# Patient Record
(demographics unavailable — no encounter records)

---

## 2025-07-16 NOTE — HISTORY OF PRESENT ILLNESS
[FreeTextEntry1] :   Switching gastros, last GI called her drunk Adela Echeverria.  Recently diagnosed with HP, did antibiotic round, wants to get tested again. Has been 2 mos since completing therapy. Not on any acid blocking medications at this time.  She went to see Dr. Echeverria because of weird sharp pains in pelvic area. She saw gyn a few times and gyn can't see anything that might be causing it.  For 1.5 yrs - 2 yrs she has been having random sharp pains, lightning crotch that happens.  When her gyn couldn't figure it out, had her see a GI and did a clean out and seemed to help for possible constipation.  However it is coming back, came back strong when on antibiotics.  She was going to order pelvic MRI to see if any other issues.   Has never had a colonoscopy.   Goes every 2 days for BM. Hard stools.  In terms of a bowel regimen, does not do anything.  Tried fiber that you put in water.  Good about drinking water. Drinks 4-5 glasses of water a day.   Pain is generally on L side of body, sharp stab of pain. It does not last long but it can be disruptive.  Since the antibiotics started, was happening several times a day 10x +. Now happens once every 4-5 days.  No better or worse with BM or eating. seems very random. Not associated with movement. Pain is inside, lightning crotch when pregnant.   Has not noticed a link between anxiety and pain  PMH:  2 uteruses, 2 cervixes  anxiety  PSH:   jacquie put down leg   FH:  grandparents both had colon cancer on dad's side   SH:  smokes cigarettes, every day, smokes 1/2 ppd no marijuana smoking, once every 6 mos alcohol on weekends, 1-3 drinks/day  no illicit drug use  works as a  for [employer]  MED:  buproprion   ALL:  denies

## 2025-07-16 NOTE — ASSESSMENT
[FreeTextEntry1] : 38 yo F PMH   H pylori s/p generic pylera  - breath test today to confirm eradication  Constipation - bowel regimen  L pelvic discomfort - pelvic MRI (if medullary nail is not MRI compatbile, will consider a CT A/P), patient will call vonnie to find out about jacquie compatibility with MRI   - if MRI unrevealing (or denied by insurance), plan for colonosocpy - has had recent bloodwork that was unremarkable  Follow up in 2 mos post MRI